# Patient Record
Sex: MALE | Race: WHITE | NOT HISPANIC OR LATINO | URBAN - METROPOLITAN AREA
[De-identification: names, ages, dates, MRNs, and addresses within clinical notes are randomized per-mention and may not be internally consistent; named-entity substitution may affect disease eponyms.]

---

## 2017-05-10 PROBLEM — Z00.00 ENCOUNTER FOR PREVENTIVE HEALTH EXAMINATION: Status: ACTIVE | Noted: 2017-05-10

## 2021-06-19 ENCOUNTER — EMERGENCY (EMERGENCY)
Facility: HOSPITAL | Age: 66
LOS: 1 days | Discharge: HOME | End: 2021-06-19
Attending: EMERGENCY MEDICINE
Payer: COMMERCIAL

## 2021-06-19 VITALS
HEART RATE: 67 BPM | RESPIRATION RATE: 20 BRPM | OXYGEN SATURATION: 99 % | SYSTOLIC BLOOD PRESSURE: 126 MMHG | TEMPERATURE: 99 F | DIASTOLIC BLOOD PRESSURE: 77 MMHG

## 2021-06-19 PROCEDURE — 99283 EMERGENCY DEPT VISIT LOW MDM: CPT | Mod: 25

## 2021-06-19 PROCEDURE — 93010 ELECTROCARDIOGRAM REPORT: CPT

## 2021-06-19 PROCEDURE — 99284 EMERGENCY DEPT VISIT MOD MDM: CPT

## 2021-06-19 PROCEDURE — 71046 X-RAY EXAM CHEST 2 VIEWS: CPT

## 2021-06-19 PROCEDURE — 93005 ELECTROCARDIOGRAM TRACING: CPT

## 2021-06-19 PROCEDURE — 71046 X-RAY EXAM CHEST 2 VIEWS: CPT | Mod: 26

## 2021-06-19 PROCEDURE — 94640 AIRWAY INHALATION TREATMENT: CPT

## 2021-06-19 RX ORDER — LORATADINE 10 MG/1
10 TABLET ORAL ONCE
Refills: 0 | Status: COMPLETED | OUTPATIENT
Start: 2021-06-19 | End: 2021-06-19

## 2021-06-19 RX ORDER — FLUTICASONE PROPIONATE 50 MCG
1 SPRAY, SUSPENSION NASAL ONCE
Refills: 0 | Status: COMPLETED | OUTPATIENT
Start: 2021-06-19 | End: 2021-06-19

## 2021-06-19 RX ORDER — LORATADINE 10 MG/1
1 TABLET ORAL
Qty: 21 | Refills: 0
Start: 2021-06-19 | End: 2021-07-09

## 2021-06-19 RX ADMIN — LORATADINE 10 MILLIGRAM(S): 10 TABLET ORAL at 08:58

## 2021-06-19 RX ADMIN — Medication 1 SPRAY(S): at 08:58

## 2021-06-19 NOTE — ED STATDOCS - CLINICAL SUMMARY MEDICAL DECISION MAKING FREE TEXT BOX
Patient presenting with cough, congestion, itchy throat. unremarkable exam, likely allergies. Will obtain chest X-ray, medication for symptoms and discharge with return with precaution.

## 2021-06-19 NOTE — ED STATDOCS - OBJECTIVE STATEMENT
67 y/o male with PMHx of stroke, HLD presents to ED c/o congestion. Patient reports itchy throat, runny nose, productive cough with yellow and green phlegm and congested chest. Took no medicine to help relieve symptoms. No chest pain despite triage note. Fully vaccinated for COVID.

## 2021-06-19 NOTE — ED STATDOCS - PATIENT PORTAL LINK FT
You can access the FollowMyHealth Patient Portal offered by [facility] by registering at the following website: [email or web address]. By joining [employer]’s FollowMyHealth portal, you will also be able to view your health information using other applications (apps) compatible with our system.

## 2021-06-19 NOTE — ED STATDOCS - PROGRESS NOTE DETAILS
AJM: pt feeling improved. workup neg. ecg done in triage shows LBB. pt does not know if this is old or new. no old ecg in system. no chest pain. symptoms more c/w allergies. will dc with meds for symptoms and return precautions. pt informed of ecg findings and need to follow up with pmd for re-eval this week. copy of ecg given.

## 2021-06-19 NOTE — ED ADULT NURSE NOTE - OBJECTIVE STATEMENT
Pt presents with itchy throat over night, cough and wheezing unable to sleep. Pt denies fevers, chills, n/v,

## 2021-06-20 RX ORDER — CIPROFLOXACIN LACTATE 400MG/40ML
1 VIAL (ML) INTRAVENOUS
Qty: 7 | Refills: 0
Start: 2021-06-20 | End: 2021-06-26

## 2021-06-20 RX ORDER — LORATADINE 10 MG/1
1 TABLET ORAL
Qty: 15 | Refills: 0
Start: 2021-06-20

## 2021-06-20 NOTE — ED POST DISCHARGE NOTE - RESULT SUMMARY
CXR- There is a vaguely defined density in the left mid lateral lung field. This could represent infiltrate but follow-up to radiographic clearing to CAT scan recommended.

## 2021-06-20 NOTE — ED POST DISCHARGE NOTE - DETAILS
Called patient via telephone with number listed in chart, there was no answer, left voicemail with phone number to call ER back regarding results, will send certified letter. AJM: Spoke with pt about ct results. will send abx to pharmacy. pt verbalized understanind of need to have follow up chest CT in 1 month with PMD to assess for resolution of symptoms vs something more serious such as malignancy

## 2023-08-22 NOTE — ED ADULT NURSE NOTE - NS ED NURSE RECORD ANOTHER VITAL SIGN
-- DO NOT REPLY / DO NOT REPLY ALL --  -- Message is from [facility] [employer] ([facility]) --     Offered Waitlist if Available for the Visit Type? No     Caller is requesting an appointment - at a sooner time than what was available.       Caller wants sooner appointment - offered other approved options     Reason for Visit: Well Child     Is the patient currently scheduled? No     Preferred time to be seen: Sept 22 afternoon           Alternative phone number: [phone]     Can a detailed message be left? Yes             
Appointments made for pt and siblings. Parent/guardian verbalizes understanding and agrees to plan of care.   
Yes